# Patient Record
Sex: FEMALE | Race: WHITE
[De-identification: names, ages, dates, MRNs, and addresses within clinical notes are randomized per-mention and may not be internally consistent; named-entity substitution may affect disease eponyms.]

---

## 2018-08-23 ENCOUNTER — HOSPITAL ENCOUNTER (OUTPATIENT)
Dept: HOSPITAL 97 - OR | Age: 43
Discharge: HOME | End: 2018-08-23
Attending: SURGERY
Payer: COMMERCIAL

## 2018-08-23 DIAGNOSIS — M62.08: ICD-10-CM

## 2018-08-23 DIAGNOSIS — K43.9: Primary | ICD-10-CM

## 2018-08-23 PROCEDURE — 81025 URINE PREGNANCY TEST: CPT

## 2018-08-23 PROCEDURE — 0WUF4JZ SUPPLEMENT ABDOMINAL WALL WITH SYNTHETIC SUBSTITUTE, PERCUTANEOUS ENDOSCOPIC APPROACH: ICD-10-PCS

## 2018-08-23 RX ADMIN — MEPERIDINE HYDROCHLORIDE ONE MG: 50 INJECTION, SOLUTION INTRAMUSCULAR; INTRAVENOUS; SUBCUTANEOUS at 10:36

## 2018-08-23 RX ADMIN — MEPERIDINE HYDROCHLORIDE ONE MG: 50 INJECTION, SOLUTION INTRAMUSCULAR; INTRAVENOUS; SUBCUTANEOUS at 10:31

## 2018-08-23 RX ADMIN — MEPERIDINE HYDROCHLORIDE ONE MG: 50 INJECTION, SOLUTION INTRAMUSCULAR; INTRAVENOUS; SUBCUTANEOUS at 10:43

## 2018-08-23 RX ADMIN — MEPERIDINE HYDROCHLORIDE ONE MG: 50 INJECTION, SOLUTION INTRAMUSCULAR; INTRAVENOUS; SUBCUTANEOUS at 10:26

## 2018-08-23 NOTE — P.OP
Assistant: DUNCAN STARR


Preoperative diagnosis: Ventral Hernia 


Postoperative diagnosis: Ventral Hernia


Primary procedure: Laparoscopic Ventral Hernia


Anesthesia: GETA + Local


Estimated blood loss: <10cc


Specimen: None


Findings: Periumbilical ventral hernia, epigastric diastasis recti


Complications: None


Implants: Bard Ventralite ST 15cm x 10cm mesh


Transferred to: Recovery Room


Condition: Good

## 2018-08-23 NOTE — OP
Date of Procedure:  08/23/2018



Surgeon:  Cartre San MD, MD



Preoperative Diagnosis:  Ventral hernia.



Postoperative Diagnosis:  Ventral hernia.



Procedure Performed:  A laparoscopic ventral hernia repair with mesh.



Anesthesia:  General endotracheal plus local, 0.25% Marcaine with epinephrine.



Estimated Blood Loss:  Less than 10 cc.



Specimens:  None.



Findings:  Periumbilical ventral hernia, epigastric diastasis recti.



Complications:  None.



Implants:  Bard Ventralight ST 15 cm x 10 cm mesh with Echo Positioning System.



Disposition:  Transferred to the recovery room in good condition.



Procedure In Detail:  After informed consent was obtained, the patient was brought to the operating r
oom and prepped and draped in the usual sterile fashion.  After adequate anesthesia was achieved, an 
area in the left lower quadrant was anesthetized with 0.25% Marcaine and sharply incised, and a 5 mm 
optical trocar was introduced in the abdomen without evidence of complication.  Insufflation was obta
ined to 15 mmHg.  At this time, there was no injury to bowel structures upon entering the abdomen.  T
he area was inspected and a periumbilical ventral hernia was appreciated at this time with fat contai
sarah.  The epigastric area was inspected also at this time and found to have diastasis recti without a
 definable hernia as such; therefore, attention was turned back to the umbilical hernia area.  Additi
onal trocar site was chosen in the right lower quadrant and this was similarly anesthetized and sharp
ly incised, and a 5-mm trocar was introduced in the abdomen without evidence of complication.  Additi
onal trocar was chosen in the left mid quadrant of the abdomen.  This was similarly anesthetized and 
sharply incised.  A 5-mm trocar was introduced in the introduced in the abdomen without any evidence 
of complication.  Three total ports were placed.  The left superior quadrant trocar was then up-sized
 to a 12 mm under direct visualization without any evidence of complication.  Using the LigaSure devyn
ce, the omental fat and preperitoneal fat was removed from the umbilical trocar region and dissection
 continued down to remove the fat all the way extending to the falciform ligament, which was left int
act.  This fat tissue was ligated and removed through the umbilical trocar after placing EndoCatch ba
g into the abdomen.  All the specimen was removed.  It was simple preperitoneal fat which was firmly 
adherent to this area, which was devascularized.  It was not sent off for pathologic examination; it 
was simply removed from the field at this time.  The area was inspected and proper hemostasis was ach
ieved this time.  The hernia defect in the periumbilical ventral position was then sized appropriatel
y and found to be approximately 8 cm defect.  A 15 x 10 cm mesh was used.  As it was in the long lie,
 this allowed for 5 cm of overlay circumferentially as the hernia defect was approximately 8 x 5 cm. 
 The Bard Ventralight ST 15 x 10 cm mesh was then rolled, placed through the lateral port, and positi
oned using the Echo Positioning System through the center portion of the defect by inflating the ball
oon.  After this was positioned appropriately, the absorbable fixation tacks were then placed into th
e abdominal wall, securing this circumferentially.  The balloon deployment system was then removed an
d found to be intact on the back table.  After this was done, the mesh was then resecured at double c
rown type position to the abdominal wall with the same set absorbable fixation tacks.  Approximately 
30 tacks were used with good approximation to the abdominal wall.  The area was inspected for hemosta
sis, which was achieved at this time without any additional hemostatic maneuvers.  Under a slight blaine
ufflation, the area was inspected once again and the mesh was found to be in good position.  The 12 m
m trocar was then removed and the trocar site was then closed with a Albert-Aurelia suture passer us
ing a 0 Vicryl in interrupted fashion with good approximation of the tissues.  The abdomen was then c
ompletely desufflated under direct visualization without any evidence of complication.  All skin inci
sions were copiously irrigated and closed with 4-0 Monocryl in a running fashion.  Dermabond was plac
ed over top.  The patient tolerated the procedure well without evidence 

of complication and transferred PACU in good condition.  All counts were correct at the end of the ca
se.





TK/MODL

DD:  08/23/2018 09:43:05Voice ID:  [id number]

DT:  08/23/2018 20:13:57Report ID:  [id number]